# Patient Record
Sex: FEMALE | Race: WHITE | NOT HISPANIC OR LATINO | Employment: OTHER | URBAN - METROPOLITAN AREA
[De-identification: names, ages, dates, MRNs, and addresses within clinical notes are randomized per-mention and may not be internally consistent; named-entity substitution may affect disease eponyms.]

---

## 2023-07-15 DIAGNOSIS — M19.90 UNSPECIFIED OSTEOARTHRITIS, UNSPECIFIED SITE: ICD-10-CM

## 2023-07-17 RX ORDER — DICLOFENAC SODIUM 50 MG/1
TABLET, DELAYED RELEASE ORAL
Qty: 30 TABLET | Refills: 3 | Status: SHIPPED | OUTPATIENT
Start: 2023-07-17 | End: 2023-11-14

## 2023-08-28 DIAGNOSIS — M54.16 RADICULOPATHY, LUMBAR REGION: ICD-10-CM

## 2023-08-28 RX ORDER — GABAPENTIN 300 MG/1
300 CAPSULE ORAL 4 TIMES DAILY
Qty: 120 CAPSULE | Refills: 3 | Status: SHIPPED | OUTPATIENT
Start: 2023-08-28 | End: 2024-01-22 | Stop reason: SDUPTHER

## 2023-11-14 DIAGNOSIS — M19.90 UNSPECIFIED OSTEOARTHRITIS, UNSPECIFIED SITE: ICD-10-CM

## 2023-11-14 RX ORDER — DICLOFENAC SODIUM 50 MG/1
TABLET, DELAYED RELEASE ORAL
Qty: 30 TABLET | Refills: 3 | Status: SHIPPED | OUTPATIENT
Start: 2023-11-14 | End: 2024-03-13

## 2023-12-13 DIAGNOSIS — E78.5 HYPERLIPIDEMIA, UNSPECIFIED: ICD-10-CM

## 2023-12-13 RX ORDER — SIMVASTATIN 40 MG/1
TABLET, FILM COATED ORAL
Qty: 90 TABLET | Refills: 2 | Status: SHIPPED | OUTPATIENT
Start: 2023-12-13

## 2023-12-22 DIAGNOSIS — F32.A DEPRESSION, UNSPECIFIED DEPRESSION TYPE: Primary | ICD-10-CM

## 2023-12-22 RX ORDER — FLUOXETINE HYDROCHLORIDE 20 MG/1
20 CAPSULE ORAL 2 TIMES DAILY
COMMUNITY
End: 2023-12-22 | Stop reason: SDUPTHER

## 2023-12-22 RX ORDER — FLUOXETINE HYDROCHLORIDE 20 MG/1
20 CAPSULE ORAL 2 TIMES DAILY
Qty: 60 CAPSULE | Refills: 3 | Status: SHIPPED | OUTPATIENT
Start: 2023-12-22 | End: 2024-04-17

## 2024-01-22 DIAGNOSIS — M54.16 RADICULOPATHY, LUMBAR REGION: ICD-10-CM

## 2024-01-22 RX ORDER — GABAPENTIN 300 MG/1
300 CAPSULE ORAL 4 TIMES DAILY
Qty: 120 CAPSULE | Refills: 3 | Status: SHIPPED | OUTPATIENT
Start: 2024-01-22 | End: 2024-05-29

## 2024-02-22 NOTE — PROGRESS NOTES
Subjective   Sofia Calhoun is a 83 y.o. female who presents for patient is here for follow-up severe anxiety and panic Niece passed away.  HPI  83-year-old female history of hypertension depression hyperlipidemia gastroesophageal reflux disease back pain chronic depression was last seen in January 4, 2023 since patient said knee replacement Dr. Cardenas right knee was no major medical denies chest pain shortness of breath fever chill nausea vomiting constipation diarrhea this urgency fracture.  Review of Systems  10 system review pertinent as above, severe panic high bp niece passed away.  Objective     Visit Vitals  BP (!) 160/92   Pulse 78   Temp 36.6 °C (97.9 °F)   Resp 16      Physical Exam  HEENT: Atraumatic normocephalic the pupils are equal and round and reactive to light the sclerae nonicteric extraocular motion are intact.  Neck: Is supple without JVD no carotid bruits the trachea is midline there are no masses pulses are equal and bilateral with normal upstroke.  Skin: Normal.  Skin good texture.  Moist.  Good turgor.  No lesions, no rashes.  Lymph: No lymphadenopathy appreciated, no masses, no lesions  Lungs: Are clear to auscultation and percussion, good breath sounds bilaterally, no rhonchi, no wheezing, good diaphragmatic excursion.  Heart: Normal rate and normal rhythm S1, S2, no S3, no gallop, murmur or rub.  Abdomen: Soft, nontender, no organomegaly, good bowel sounds.    Extremities: Full range of motion, good pulses bilateral.  No cyanosis, no clubbing or edema.  Neuro: Cranial nerves II-XII are grossly intact there is no sensory or motor deficits.  Able to move all extremities.      Assessment/Plan     For follow-up and fasting blood works    CBC BMP lipids AST LT vitamin 25-hydroxy    Severe HTN  Due to panic  Increase hydralazin to 25 tid  Then check BP three times a day   Take an additional hydralazin   For BP Sytolic greater than 150  Or diastolic greater than 90      Continue with the  low-fat, low-cholesterol diet,  I recommended Mediterranean diet, which include fish, chicken, vegetables and olive oil  Exercise daily for 30 minutes at least 3 times a week  Continue home medications    Hypertension  No added salt diet, do not and salt to your food  Try to exercise every other day for 30 minutes  Continue current medications    Your depression  Continue current medication    Gastroesophageal reflux disease  Continue current medications  Include spicy foods do not eat late at night  Do not wear tight fitting clothes  Exercise daily    OARRS:  Nirmal Vazquez MD on 2024  3:04 PM  I have personally reviewed the OARRS report for Sofia Calhoun. I have considered the risks of abuse, dependence, addiction and diversion    Is the patient prescribed a combination of a benzodiazepine and opioid?  No    Last Urine Drug Screen / ordered today: Yes  No results found for this or any previous visit (from the past 8760 hour(s)).  Results are as expected.     Clinical rationale for not completing a Urine Drug Screen: na      Controlled Substance Agreement:  Date of the Last Agreement: 24  Reviewed Controlled Substance Agreement including but not limited to the benefits, risks, and alternatives to treatment with a Controlled Substance medication(s).    Benzodiazepines:  What is the patient's goal of therapy?  Severe intermittent anxiety  Is this being achieved with current treatment?  Yes took in the past    CHRISTELLE-7:  Over the last 2 weeks, how often have you been bothered by any of the following problems?  Feeling nervous, anxious, or on edge: 2  Not being able to stop or control worrying: 3  Worrying too much about different things: 2  Trouble relaxin  Being so restless that it is hard to sit still: 3  Becoming easily annoyed or irritable: 2  Feeling afraid as if something awful might happen: 2  CHRISTELLE-7 Total Score: 16        Activities of Daily Living:   Is your overall impression that this patient  is benefiting (symptom reduction outweighs side effects) from benzodiazepine therapy? Yes     1. Physical Functioning: Better  2. Family Relationship: Better  3. Social Relationship: Better  4. Mood: Better  5. Sleep Patterns: Better  6. Overall Function: Better      Problem List Items Addressed This Visit    None        Nirmal Vazquez MD

## 2024-02-26 ENCOUNTER — OFFICE VISIT (OUTPATIENT)
Dept: PRIMARY CARE | Facility: CLINIC | Age: 84
End: 2024-02-26
Payer: MEDICARE

## 2024-02-26 VITALS
WEIGHT: 165 LBS | DIASTOLIC BLOOD PRESSURE: 92 MMHG | HEART RATE: 78 BPM | RESPIRATION RATE: 16 BRPM | SYSTOLIC BLOOD PRESSURE: 160 MMHG | TEMPERATURE: 97.9 F | BODY MASS INDEX: 28.17 KG/M2 | HEIGHT: 64 IN

## 2024-02-26 DIAGNOSIS — E55.9 VITAMIN D INSUFFICIENCY: ICD-10-CM

## 2024-02-26 DIAGNOSIS — E78.2 MODERATE MIXED HYPERLIPIDEMIA NOT REQUIRING STATIN THERAPY: ICD-10-CM

## 2024-02-26 DIAGNOSIS — F41.9 ANXIETY: Primary | ICD-10-CM

## 2024-02-26 DIAGNOSIS — K21.9 GASTROESOPHAGEAL REFLUX DISEASE WITHOUT ESOPHAGITIS: ICD-10-CM

## 2024-02-26 DIAGNOSIS — F43.0 PANIC STATE AS ACUTE REACTION TO STRESS: ICD-10-CM

## 2024-02-26 DIAGNOSIS — Z79.899 MEDICATION MANAGEMENT: ICD-10-CM

## 2024-02-26 DIAGNOSIS — I10 SEVERE HYPERTENSION: ICD-10-CM

## 2024-02-26 DIAGNOSIS — R53.83 TIREDNESS: ICD-10-CM

## 2024-02-26 PROBLEM — M19.90 OSTEOARTHRITIS (ARTHRITIS DUE TO WEAR AND TEAR OF JOINTS): Status: ACTIVE | Noted: 2024-02-26

## 2024-02-26 LAB
AMPHETAMINES UR QL SCN: NORMAL
BARBITURATES UR QL SCN: NORMAL
BENZODIAZ UR QL SCN: NORMAL
BZE UR QL SCN: NORMAL
CANNABINOIDS UR QL SCN: NORMAL
FENTANYL+NORFENTANYL UR QL SCN: NORMAL
OPIATES UR QL SCN: NORMAL
OXYCODONE+OXYMORPHONE UR QL SCN: NORMAL
PCP UR QL SCN: NORMAL

## 2024-02-26 PROCEDURE — 84450 TRANSFERASE (AST) (SGOT): CPT | Performed by: INTERNAL MEDICINE

## 2024-02-26 PROCEDURE — 1036F TOBACCO NON-USER: CPT | Performed by: INTERNAL MEDICINE

## 2024-02-26 PROCEDURE — 3080F DIAST BP >= 90 MM HG: CPT | Performed by: INTERNAL MEDICINE

## 2024-02-26 PROCEDURE — 84443 ASSAY THYROID STIM HORMONE: CPT | Performed by: INTERNAL MEDICINE

## 2024-02-26 PROCEDURE — 80307 DRUG TEST PRSMV CHEM ANLYZR: CPT

## 2024-02-26 PROCEDURE — 85025 COMPLETE CBC W/AUTO DIFF WBC: CPT | Performed by: INTERNAL MEDICINE

## 2024-02-26 PROCEDURE — 1159F MED LIST DOCD IN RCRD: CPT | Performed by: INTERNAL MEDICINE

## 2024-02-26 PROCEDURE — 80048 BASIC METABOLIC PNL TOTAL CA: CPT | Performed by: INTERNAL MEDICINE

## 2024-02-26 PROCEDURE — 84460 ALANINE AMINO (ALT) (SGPT): CPT | Performed by: INTERNAL MEDICINE

## 2024-02-26 PROCEDURE — 99214 OFFICE O/P EST MOD 30 MIN: CPT | Performed by: INTERNAL MEDICINE

## 2024-02-26 PROCEDURE — 80061 LIPID PANEL: CPT | Performed by: INTERNAL MEDICINE

## 2024-02-26 PROCEDURE — 1126F AMNT PAIN NOTED NONE PRSNT: CPT | Performed by: INTERNAL MEDICINE

## 2024-02-26 PROCEDURE — 3077F SYST BP >= 140 MM HG: CPT | Performed by: INTERNAL MEDICINE

## 2024-02-26 RX ORDER — HYDRALAZINE HYDROCHLORIDE 25 MG/1
25 TABLET, FILM COATED ORAL 2 TIMES DAILY
COMMUNITY
Start: 2023-12-19

## 2024-02-26 RX ORDER — METOPROLOL SUCCINATE 25 MG/1
TABLET, EXTENDED RELEASE ORAL
COMMUNITY
Start: 2023-12-14

## 2024-02-26 RX ORDER — CLONAZEPAM 0.5 MG/1
0.5 TABLET ORAL NIGHTLY
Qty: 30 TABLET | Refills: 0 | Status: SHIPPED | OUTPATIENT
Start: 2024-02-26 | End: 2024-03-27

## 2024-02-26 RX ORDER — LISINOPRIL 20 MG/1
1 TABLET ORAL DAILY
COMMUNITY
Start: 2023-01-04

## 2024-02-26 ASSESSMENT — ANXIETY QUESTIONNAIRES
6. BECOMING EASILY ANNOYED OR IRRITABLE: MORE THAN HALF THE DAYS
IF YOU CHECKED OFF ANY PROBLEMS ON THIS QUESTIONNAIRE, HOW DIFFICULT HAVE THESE PROBLEMS MADE IT FOR YOU TO DO YOUR WORK, TAKE CARE OF THINGS AT HOME, OR GET ALONG WITH OTHER PEOPLE: SOMEWHAT DIFFICULT
5. BEING SO RESTLESS THAT IT IS HARD TO SIT STILL: NEARLY EVERY DAY
1. FEELING NERVOUS, ANXIOUS, OR ON EDGE: MORE THAN HALF THE DAYS
3. WORRYING TOO MUCH ABOUT DIFFERENT THINGS: MORE THAN HALF THE DAYS
4. TROUBLE RELAXING: MORE THAN HALF THE DAYS
7. FEELING AFRAID AS IF SOMETHING AWFUL MIGHT HAPPEN: MORE THAN HALF THE DAYS
2. NOT BEING ABLE TO STOP OR CONTROL WORRYING: NEARLY EVERY DAY
GAD7 TOTAL SCORE: 16

## 2024-02-26 ASSESSMENT — ENCOUNTER SYMPTOMS
OCCASIONAL FEELINGS OF UNSTEADINESS: 0
DEPRESSION: 0
LOSS OF SENSATION IN FEET: 0

## 2024-02-26 ASSESSMENT — PAIN SCALES - GENERAL: PAINLEVEL: 0-NO PAIN

## 2024-03-13 DIAGNOSIS — K21.9 GASTRO-ESOPHAGEAL REFLUX DISEASE WITHOUT ESOPHAGITIS: ICD-10-CM

## 2024-03-13 DIAGNOSIS — M19.90 UNSPECIFIED OSTEOARTHRITIS, UNSPECIFIED SITE: ICD-10-CM

## 2024-03-13 RX ORDER — PANTOPRAZOLE SODIUM 40 MG/1
40 TABLET, DELAYED RELEASE ORAL DAILY
Qty: 30 TABLET | Refills: 6 | Status: SHIPPED | OUTPATIENT
Start: 2024-03-13

## 2024-03-13 RX ORDER — DICLOFENAC SODIUM 50 MG/1
TABLET, DELAYED RELEASE ORAL
Qty: 30 TABLET | Refills: 3 | Status: SHIPPED | OUTPATIENT
Start: 2024-03-13

## 2024-04-13 DIAGNOSIS — F32.A DEPRESSION, UNSPECIFIED DEPRESSION TYPE: ICD-10-CM

## 2024-04-17 RX ORDER — FLUOXETINE HYDROCHLORIDE 20 MG/1
20 CAPSULE ORAL 2 TIMES DAILY
Qty: 60 CAPSULE | Refills: 3 | Status: SHIPPED | OUTPATIENT
Start: 2024-04-17

## 2024-05-29 DIAGNOSIS — M54.16 RADICULOPATHY, LUMBAR REGION: ICD-10-CM

## 2024-05-29 RX ORDER — GABAPENTIN 300 MG/1
300 CAPSULE ORAL 4 TIMES DAILY
Qty: 120 CAPSULE | Refills: 3 | Status: SHIPPED | OUTPATIENT
Start: 2024-05-29

## 2024-07-18 DIAGNOSIS — M19.90 UNSPECIFIED OSTEOARTHRITIS, UNSPECIFIED SITE: ICD-10-CM

## 2024-07-18 RX ORDER — DICLOFENAC SODIUM 50 MG/1
TABLET, DELAYED RELEASE ORAL
Qty: 30 TABLET | Refills: 3 | Status: SHIPPED | OUTPATIENT
Start: 2024-07-18

## 2024-08-14 DIAGNOSIS — F32.A DEPRESSION, UNSPECIFIED DEPRESSION TYPE: ICD-10-CM

## 2024-08-15 RX ORDER — FLUOXETINE HYDROCHLORIDE 20 MG/1
20 CAPSULE ORAL 2 TIMES DAILY
Qty: 60 CAPSULE | Refills: 3 | Status: SHIPPED | OUTPATIENT
Start: 2024-08-15

## 2024-09-11 DIAGNOSIS — E78.5 HYPERLIPIDEMIA, UNSPECIFIED: ICD-10-CM

## 2024-09-11 RX ORDER — SIMVASTATIN 40 MG/1
TABLET, FILM COATED ORAL
Qty: 90 TABLET | Refills: 2 | Status: SHIPPED | OUTPATIENT
Start: 2024-09-11

## 2024-09-26 DIAGNOSIS — M54.16 RADICULOPATHY, LUMBAR REGION: ICD-10-CM

## 2024-09-26 RX ORDER — GABAPENTIN 300 MG/1
300 CAPSULE ORAL 4 TIMES DAILY
Qty: 120 CAPSULE | Refills: 3 | Status: SHIPPED | OUTPATIENT
Start: 2024-09-26

## 2024-10-18 DIAGNOSIS — K21.9 GASTRO-ESOPHAGEAL REFLUX DISEASE WITHOUT ESOPHAGITIS: ICD-10-CM

## 2024-10-18 RX ORDER — PANTOPRAZOLE SODIUM 40 MG/1
40 TABLET, DELAYED RELEASE ORAL DAILY
Qty: 30 TABLET | Refills: 6 | Status: SHIPPED | OUTPATIENT
Start: 2024-10-18

## 2024-10-20 DIAGNOSIS — M19.90 UNSPECIFIED OSTEOARTHRITIS, UNSPECIFIED SITE: ICD-10-CM

## 2024-10-21 RX ORDER — DICLOFENAC SODIUM 50 MG/1
TABLET, DELAYED RELEASE ORAL
Qty: 30 TABLET | Refills: 1 | Status: SHIPPED | OUTPATIENT
Start: 2024-10-21

## 2024-11-21 NOTE — PROGRESS NOTES
Subjective   Sofia Calhoun is a 84 y.o. female who presents for patient is here for follow-up severe anxiety and panic Niece passed away.  HPI  84-year-old female history of hypertension depression hyperlipidemia gastroesophageal reflux disease back pain chronic depression was last seen in January 4, 2023 since patient said knee replacement Dr. Cardenas right knee was no major medical denies chest pain shortness of breath fever chill nausea vomiting constipation diarrhea this urgency fracture.  Review of Systems  10 system review pertinent as above, severe panic high bp niece passed away.  Objective     Visit Vitals  /85   Pulse 78   Temp 36.6 °C (97.9 °F)        Physical Exam  HEENT: Atraumatic normocephalic the pupils are equal and round and reactive to light the sclerae nonicteric extraocular motion are intact.  Neck: Is supple without JVD no carotid bruits the trachea is midline there are no masses pulses are equal and bilateral with normal upstroke.  Skin: Normal.  Skin good texture.  Moist.  Good turgor.  No lesions, no rashes.  Lymph: No lymphadenopathy appreciated, no masses, no lesions  Lungs: Are clear to auscultation and percussion, good breath sounds bilaterally, no rhonchi, no wheezing, good diaphragmatic excursion.  Heart: Normal rate and normal rhythm S1, S2, no S3, no gallop, murmur or rub.  Abdomen: Soft, nontender, no organomegaly, good bowel sounds.    Extremities: Full range of motion, good pulses bilateral.  No cyanosis, no clubbing or edema.  Neuro: Cranial nerves II-XII are grossly intact there is no sensory or motor deficits.  Able to move all extremities.      Assessment/Plan     For follow-up and fasting blood works    CBC BMP lipids AST LT vitamin 25-hydroxy      Immunizations  High dose flu vaccine      HTN  Increase hydralazin to 25 tid  Then check BP three times a day   Take an additional hydralazin   For BP Sytolic greater than 150  Or diastolic greater than  90      Continue with the low-fat, low-cholesterol diet,  I recommended Mediterranean diet, which include fish, chicken, vegetables and olive oil  Exercise daily for 30 minutes at least 3 times a week  Continue home medications    Hypertension  No added salt diet, do not and salt to your food  Try to exercise every other day for 30 minutes  Continue current medications    Your depression  Continue current medication    Gastroesophageal reflux disease  Continue current medications  Include spicy foods do not eat late at night  Do not wear tight fitting clothes  Exercise daily    OARRS:  Nirmal Vazquez MD on 11/26/2024 11:46 AM  I have personally reviewed the OARRS report for Sofia Calhoun. I have considered the risks of abuse, dependence, addiction and diversion    Is the patient prescribed a combination of a benzodiazepine and opioid?  No    Last Urine Drug Screen / ordered today: Yes  Recent Results (from the past 8760 hours)   Drug Screen, Urine With Reflex to Confirmation    Collection Time: 02/26/24  3:50 PM   Result Value Ref Range    Amphetamine Screen, Urine Presumptive Negative Presumptive Negative    Barbiturate Screen, Urine Presumptive Negative Presumptive Negative    Benzodiazepines Screen, Urine Presumptive Negative Presumptive Negative    Cannabinoid Screen, Urine Presumptive Negative Presumptive Negative    Cocaine Metabolite Screen, Urine Presumptive Negative Presumptive Negative    Fentanyl Screen, Urine Presumptive Negative Presumptive Negative    Opiate Screen, Urine Presumptive Negative Presumptive Negative    Oxycodone Screen, Urine Presumptive Negative Presumptive Negative    PCP Screen, Urine Presumptive Negative Presumptive Negative     Results are as expected.     Clinical rationale for not completing a Urine Drug Screen: na      Controlled Substance Agreement:  Date of the Last Agreement: 02/26/24  Reviewed Controlled Substance Agreement including but not limited to the benefits, risks,  and alternatives to treatment with a Controlled Substance medication(s).    Benzodiazepines:  What is the patient's goal of therapy?  Severe intermittent anxiety  Is this being achieved with current treatment?  Yes took in the past    CHRISTELLE-7:  Over the last 2 weeks, how often have you been bothered by any of the following problems?  Feeling nervous, anxious, or on edge: 1  Not being able to stop or control worryin  Worrying too much about different things: 1  Trouble relaxin  Being so restless that it is hard to sit still: 2  Becoming easily annoyed or irritable: 2  Feeling afraid as if something awful might happen: 1  CHRISTELLE-7 Total Score: 10          Activities of Daily Living:   Is your overall impression that this patient is benefiting (symptom reduction outweighs side effects) from benzodiazepine therapy? Yes     1. Physical Functioning: Better  2. Family Relationship: Better  3. Social Relationship: Better  4. Mood: Better  5. Sleep Patterns: Better  6. Overall Function: Better      Problem List Items Addressed This Visit       Anxiety       Orders:    clonazePAM (KlonoPIN) 0.5 mg tablet; Take 1 tablet (0.5 mg) by mouth once daily at bedtime.           Relevant Medications    clonazePAM (KlonoPIN) 0.5 mg tablet    Severe hypertension                   Hypertension - Primary       Orders:    hydrALAZINE (Apresoline) 25 mg tablet; Take 1 tablet (25 mg) by mouth 3 times a day.    lisinopril 20 mg tablet; Take 1 tablet (20 mg) by mouth once daily.    Basic Metabolic Panel           Relevant Medications    hydrALAZINE (Apresoline) 25 mg tablet    lisinopril 20 mg tablet    Other Relevant Orders    Basic Metabolic Panel    Vitamin D insufficiency                   S/P total knee arthroplasty                   Hyperlipidemia       Orders:    Lipid Panel    AST    ALT           Relevant Orders    Lipid Panel    AST    ALT    GERD (gastroesophageal reflux disease)       Orders:    CBC w/5 Part Differential,  [facility]           Relevant Orders    CBC w/5 Part Differential, [facility]    Diverticulosis                    Other Visit Diagnoses       Medication management        Relevant Orders    Drug Screen, Urine With Reflex to Confirmation    Adult onset hypothyroidism        Relevant Orders    TSH    Panic state as acute reaction to stress        Relevant Medications    clonazePAM (KlonoPIN) 0.5 mg tablet              Nirmal Vazquez MD

## 2024-11-26 ENCOUNTER — APPOINTMENT (OUTPATIENT)
Dept: PRIMARY CARE | Facility: CLINIC | Age: 84
End: 2024-11-26
Payer: MEDICARE

## 2024-11-26 VITALS
DIASTOLIC BLOOD PRESSURE: 85 MMHG | TEMPERATURE: 97.9 F | HEART RATE: 78 BPM | BODY MASS INDEX: 28.17 KG/M2 | WEIGHT: 165 LBS | SYSTOLIC BLOOD PRESSURE: 136 MMHG | HEIGHT: 64 IN

## 2024-11-26 DIAGNOSIS — F43.0 PANIC STATE AS ACUTE REACTION TO STRESS: ICD-10-CM

## 2024-11-26 DIAGNOSIS — K57.90 DIVERTICULOSIS: ICD-10-CM

## 2024-11-26 DIAGNOSIS — I10 PRIMARY HYPERTENSION: Primary | ICD-10-CM

## 2024-11-26 DIAGNOSIS — E78.2 MODERATE MIXED HYPERLIPIDEMIA NOT REQUIRING STATIN THERAPY: ICD-10-CM

## 2024-11-26 DIAGNOSIS — E55.9 VITAMIN D INSUFFICIENCY: ICD-10-CM

## 2024-11-26 DIAGNOSIS — Z79.899 MEDICATION MANAGEMENT: ICD-10-CM

## 2024-11-26 DIAGNOSIS — I10 SEVERE HYPERTENSION: ICD-10-CM

## 2024-11-26 DIAGNOSIS — F41.9 ANXIETY: ICD-10-CM

## 2024-11-26 DIAGNOSIS — E03.8 ADULT ONSET HYPOTHYROIDISM: ICD-10-CM

## 2024-11-26 DIAGNOSIS — K21.9 GASTROESOPHAGEAL REFLUX DISEASE WITHOUT ESOPHAGITIS: ICD-10-CM

## 2024-11-26 DIAGNOSIS — Z96.653 STATUS POST TOTAL BILATERAL KNEE REPLACEMENT: ICD-10-CM

## 2024-11-26 LAB
ALT SERPL W P-5'-P-CCNC: 24 U/L (ref 16–63)
AMPHETAMINES UR QL SCN: NORMAL
ANION GAP SERPL CALC-SCNC: 15 MMOL/L (ref 10–20)
AST SERPL W P-5'-P-CCNC: 18 U/L (ref 15–37)
BARBITURATES UR QL SCN: NORMAL
BASOPHILS # BLD AUTO: 0.01 X10*3/UL (ref 0.1–1.6)
BASOPHILS NFR BLD AUTO: 0.32 % (ref 0–0.3)
BENZODIAZ UR QL SCN: NORMAL
BUN SERPL-MCNC: 20 MG/DL (ref 7–18)
BZE UR QL SCN: NORMAL
CALCIUM SERPL-MCNC: 8.3 MG/DL (ref 8.5–10.1)
CANNABINOIDS UR QL SCN: NORMAL
CHLORIDE SERPL-SCNC: 104 MMOL/L (ref 98–107)
CHOLEST SERPL-MCNC: 152 MG/DL (ref 0–199)
CHOLESTEROL/HDL RATIO: 1.9 (ref 4.2–7)
CO2 SERPL-SCNC: 26 MMOL/L (ref 21–32)
CREAT SERPL-MCNC: 0.82 MG/DL (ref 0.6–1.1)
EGFRCR SERPLBLD CKD-EPI 2021: 70 ML/MIN/1.73M*2
EOSINOPHIL # BLD AUTO: 0.05 X10*3/UL (ref 0.04–0.5)
EOSINOPHIL NFR BLD AUTO: 1.37 % (ref 0.7–7)
ERYTHROCYTE [DISTWIDTH] IN BLOOD BY AUTOMATED COUNT: 14.8 % (ref 11.5–14.5)
FENTANYL+NORFENTANYL UR QL SCN: NORMAL
GLUCOSE SERPL-MCNC: 92 MG/DL (ref 74–100)
HCT VFR BLD AUTO: 39 % (ref 36.6–46.6)
HDLC SERPL-MCNC: 82 MG/DL (ref 40–59)
HGB BLD-MCNC: 13.05 G/DL (ref 12–15.4)
IS PATIENT FASTING: YES
LDLC SERPL DIRECT ASSAY-MCNC: 61 MG/DL (ref 0–100)
LYMPHOCYTES # BLD AUTO: 1.38 X10*3/UL (ref 0–6)
LYMPHOCYTES NFR BLD AUTO: 35.42 % (ref 20.5–51.1)
MCH RBC QN AUTO: 30.5 PG (ref 26–32)
MCHC RBC AUTO-ENTMCNC: 33.5 G/DL (ref 31–38)
MCV RBC AUTO: 91.2 FL (ref 80–96)
METHADONE UR QL SCN: NORMAL
MONOCYTES # BLD AUTO: 0.32 X10*3/UL (ref 1.6–24.9)
MONOCYTES NFR BLD AUTO: 8.29 % (ref 1.7–9.3)
NEUTROPHILS # BLD AUTO: 2.13 X10*3/UL (ref 1.4–6.5)
NEUTROPHILS NFR BLD AUTO: 54.6 % (ref 42.2–75.2)
OPIATES UR QL SCN: NORMAL
OXYCODONE+OXYMORPHONE UR QL SCN: NORMAL
PCP UR QL SCN: NORMAL
PLATELET # BLD AUTO: 193.3 X10*3/UL (ref 150–450)
PMV BLD AUTO: 10.55 FL (ref 7.8–11)
POTASSIUM SERPL-SCNC: 4.2 MMOL/L (ref 3.5–5.1)
RBC # BLD AUTO: 4.28 X10*6/UL (ref 3.9–5.3)
SODIUM SERPL-SCNC: 141 MMOL/L (ref 136–145)
TRIGL SERPL-MCNC: 69 MG/DL
TSH SERPL-ACNC: 1.91 MIU/L (ref 0.44–3.98)
WBC # BLD AUTO: 3.9 X10*3/UL (ref 4.5–10.5)

## 2024-11-26 PROCEDURE — G0442 ANNUAL ALCOHOL SCREEN 15 MIN: HCPCS | Performed by: INTERNAL MEDICINE

## 2024-11-26 PROCEDURE — 80307 DRUG TEST PRSMV CHEM ANLYZR: CPT

## 2024-11-26 PROCEDURE — G2211 COMPLEX E/M VISIT ADD ON: HCPCS | Performed by: INTERNAL MEDICINE

## 2024-11-26 PROCEDURE — G0439 PPPS, SUBSEQ VISIT: HCPCS | Performed by: INTERNAL MEDICINE

## 2024-11-26 PROCEDURE — 84450 TRANSFERASE (AST) (SGOT): CPT | Performed by: INTERNAL MEDICINE

## 2024-11-26 PROCEDURE — 1158F ADVNC CARE PLAN TLK DOCD: CPT | Performed by: INTERNAL MEDICINE

## 2024-11-26 PROCEDURE — 80048 BASIC METABOLIC PNL TOTAL CA: CPT | Performed by: INTERNAL MEDICINE

## 2024-11-26 PROCEDURE — 80061 LIPID PANEL: CPT | Performed by: INTERNAL MEDICINE

## 2024-11-26 PROCEDURE — G0444 DEPRESSION SCREEN ANNUAL: HCPCS | Performed by: INTERNAL MEDICINE

## 2024-11-26 PROCEDURE — 1159F MED LIST DOCD IN RCRD: CPT | Performed by: INTERNAL MEDICINE

## 2024-11-26 PROCEDURE — 99497 ADVNCD CARE PLAN 30 MIN: CPT | Performed by: INTERNAL MEDICINE

## 2024-11-26 PROCEDURE — 1125F AMNT PAIN NOTED PAIN PRSNT: CPT | Performed by: INTERNAL MEDICINE

## 2024-11-26 PROCEDURE — 3079F DIAST BP 80-89 MM HG: CPT | Performed by: INTERNAL MEDICINE

## 2024-11-26 PROCEDURE — 1170F FXNL STATUS ASSESSED: CPT | Performed by: INTERNAL MEDICINE

## 2024-11-26 PROCEDURE — 1160F RVW MEDS BY RX/DR IN RCRD: CPT | Performed by: INTERNAL MEDICINE

## 2024-11-26 PROCEDURE — 85025 COMPLETE CBC W/AUTO DIFF WBC: CPT | Performed by: INTERNAL MEDICINE

## 2024-11-26 PROCEDURE — 99214 OFFICE O/P EST MOD 30 MIN: CPT | Performed by: INTERNAL MEDICINE

## 2024-11-26 PROCEDURE — G0447 BEHAVIOR COUNSEL OBESITY 15M: HCPCS | Performed by: INTERNAL MEDICINE

## 2024-11-26 PROCEDURE — 84443 ASSAY THYROID STIM HORMONE: CPT | Performed by: INTERNAL MEDICINE

## 2024-11-26 PROCEDURE — 84460 ALANINE AMINO (ALT) (SGPT): CPT | Performed by: INTERNAL MEDICINE

## 2024-11-26 PROCEDURE — 3075F SYST BP GE 130 - 139MM HG: CPT | Performed by: INTERNAL MEDICINE

## 2024-11-26 PROCEDURE — 1123F ACP DISCUSS/DSCN MKR DOCD: CPT | Performed by: INTERNAL MEDICINE

## 2024-11-26 RX ORDER — HYDRALAZINE HYDROCHLORIDE 25 MG/1
25 TABLET, FILM COATED ORAL 3 TIMES DAILY
Start: 2024-11-26 | End: 2025-11-26

## 2024-11-26 RX ORDER — CLONAZEPAM 0.5 MG/1
0.5 TABLET ORAL NIGHTLY
Qty: 30 TABLET | Refills: 0 | Status: SHIPPED | OUTPATIENT
Start: 2024-11-26 | End: 2024-12-26

## 2024-11-26 RX ORDER — LISINOPRIL 20 MG/1
20 TABLET ORAL DAILY
Start: 2024-11-26 | End: 2025-11-26

## 2024-11-26 ASSESSMENT — ACTIVITIES OF DAILY LIVING (ADL)
PATIENT'S MEMORY ADEQUATE TO SAFELY COMPLETE DAILY ACTIVITIES?: YES
FEEDING YOURSELF: INDEPENDENT
TOILETING: INDEPENDENT
NEEDS ASSISTANCE WITH FOOD: INDEPENDENT
USING TELEPHONE: INDEPENDENT
BATHING: INDEPENDENT
JUDGMENT_ADEQUATE_SAFELY_COMPLETE_DAILY_ACTIVITIES: YES
PREPARING MEALS: INDEPENDENT
USING TRANSPORTATION: INDEPENDENT
STIL DRIVING: YES
WALKS IN HOME: INDEPENDENT
DRESSING YOURSELF: INDEPENDENT
TAKING MEDICATION: INDEPENDENT
HEARING - RIGHT EAR: FUNCTIONAL
EATING: INDEPENDENT
GROOMING: INDEPENDENT
GROCERY SHOPPING: INDEPENDENT
MANAGING FINANCES: INDEPENDENT
PILL BOX USED: NO
DOING HOUSEWORK: INDEPENDENT
ADEQUATE_TO_COMPLETE_ADL: YES

## 2024-11-26 ASSESSMENT — COLUMBIA-SUICIDE SEVERITY RATING SCALE - C-SSRS
6. HAVE YOU EVER DONE ANYTHING, STARTED TO DO ANYTHING, OR PREPARED TO DO ANYTHING TO END YOUR LIFE?: NO
1. IN THE PAST MONTH, HAVE YOU WISHED YOU WERE DEAD OR WISHED YOU COULD GO TO SLEEP AND NOT WAKE UP?: NO
2. HAVE YOU ACTUALLY HAD ANY THOUGHTS OF KILLING YOURSELF?: NO

## 2024-11-26 ASSESSMENT — ANXIETY QUESTIONNAIRES
IF YOU CHECKED OFF ANY PROBLEMS ON THIS QUESTIONNAIRE, HOW DIFFICULT HAVE THESE PROBLEMS MADE IT FOR YOU TO DO YOUR WORK, TAKE CARE OF THINGS AT HOME, OR GET ALONG WITH OTHER PEOPLE: NOT DIFFICULT AT ALL
GAD7 TOTAL SCORE: 10
4. TROUBLE RELAXING: SEVERAL DAYS
6. BECOMING EASILY ANNOYED OR IRRITABLE: MORE THAN HALF THE DAYS
7. FEELING AFRAID AS IF SOMETHING AWFUL MIGHT HAPPEN: SEVERAL DAYS
2. NOT BEING ABLE TO STOP OR CONTROL WORRYING: MORE THAN HALF THE DAYS
5. BEING SO RESTLESS THAT IT IS HARD TO SIT STILL: MORE THAN HALF THE DAYS
3. WORRYING TOO MUCH ABOUT DIFFERENT THINGS: SEVERAL DAYS
1. FEELING NERVOUS, ANXIOUS, OR ON EDGE: SEVERAL DAYS

## 2024-11-26 ASSESSMENT — ENCOUNTER SYMPTOMS
DEPRESSION: 0
OCCASIONAL FEELINGS OF UNSTEADINESS: 0
LOSS OF SENSATION IN FEET: 0

## 2024-11-26 ASSESSMENT — GERIATRIC MINI NUTRITIONAL ASSESSMENT (MNA)
B WEIGHT LOSS DURING THE LAST 3 MONTHS: NO WEIGHT LOSS
A HAS FOOD INTAKE DECLINED OVER THE PAST 3 MONTHS DUE TO LOSS OF APPETITE, DIGESTIVE PROBLEMS, CHEWING OR SWALLOWING DIFFICULTIES?: NO DECREASE IN FOOD INTAKE
D HAS SUFFERED PSYCHOLOGICAL STRESS OR ACUTE DISEASE IN THE PAST 3 MONTHS?: NO
E NEUROPSYCHOLOGICAL PROBLEMS: NO PSYCHOLOGICAL PROBLEMS
C GENERAL MOBILITY: GOES OUT

## 2024-11-26 ASSESSMENT — PATIENT HEALTH QUESTIONNAIRE - PHQ9
2. FEELING DOWN, DEPRESSED OR HOPELESS: NOT AT ALL
1. LITTLE INTEREST OR PLEASURE IN DOING THINGS: NOT AT ALL
SUM OF ALL RESPONSES TO PHQ9 QUESTIONS 1 AND 2: 0

## 2024-11-26 ASSESSMENT — PAIN SCALES - GENERAL: PAINLEVEL_OUTOF10: 2

## 2024-11-26 NOTE — PROGRESS NOTES
"Subjective   Reason for Visit: Sofia Calhoun is an 84 y.o. female here for a Medicare Wellness visit.   Patient is here for Medicare wellness  Reviewed all medications by prescribing practitioner or clinical pharmacist (such as prescriptions, OTCs, herbal therapies and supplements) and documented in the medical record.    HPI  Sofia is 84 she is self-sufficient takes her medication as prescribed with no major medical except for occasional lower back pain she reports no falling she takes her medication as prescribed without side effects to report.  Patient Care Team:  Nirmal Vazquez MD as PCP - General (Internal Medicine)  Nirmal Vazquez MD as PCP - [employer] PCP     Review of Systems  10 system review pertinent as above  Objective   Vitals:  /85   Pulse 78   Temp 36.6 °C (97.9 °F)   Ht 1.626 m (5' 4\")   Wt 74.8 kg (165 lb)   BMI 28.32 kg/m²       Physical Exam  HEENT: Atraumatic normocephalic the pupils are equal and round and reactive to light the sclerae nonicteric extraocular motion are intact.  Neck: Is supple without JVD no carotid bruits the trachea is midline there are no masses pulses are equal and bilateral with normal upstroke.  Skin: Normal.  Skin good texture.  Moist.  Good turgor.  No lesions, no rashes.  Lymph: No lymphadenopathy appreciated, no masses, no lesions  Lungs: Are clear to auscultation and percussion, good breath sounds bilaterally, no rhonchi, no wheezing, good diaphragmatic excursion.  Heart: Normal rate and normal rhythm S1, S2, no S3, no gallop, murmur or rub.  Abdomen: Soft, nontender, no organomegaly, good bowel sounds.    Extremities: Full range of motion, good pulses bilateral.  No cyanosis, no clubbing or edema.  Neuro: Cranial nerves II-XII are grossly intact there is no sensory or motor deficits.  Able to move all extremities.  Assessment & Plan  Primary hypertension    Orders:    hydrALAZINE (Apresoline) 25 mg tablet; Take 1 tablet (25 mg) by mouth 3 " times a day.    lisinopril 20 mg tablet; Take 1 tablet (20 mg) by mouth once daily.    Basic Metabolic Panel    Medication management    Orders:    Drug Screen, Urine With Reflex to Confirmation; Future    Drug Screen, Urine With Reflex to Confirmation    Severe hypertension         Moderate mixed hyperlipidemia not requiring statin therapy    Orders:    Lipid Panel    AST    ALT    Vitamin D insufficiency         Gastroesophageal reflux disease without esophagitis    Orders:    CBC w/5 Part Differential, [facility]    Diverticulosis         Status post total bilateral knee replacement         Adult onset hypothyroidism    Orders:    TSH    Anxiety    Orders:    clonazePAM (KlonoPIN) 0.5 mg tablet; Take 1 tablet (0.5 mg) by mouth once daily at bedtime.    Panic state as acute reaction to stress    Orders:    clonazePAM (KlonoPIN) 0.5 mg tablet; Take 1 tablet (0.5 mg) by mouth once daily at bedtime.

## 2024-11-26 NOTE — ASSESSMENT & PLAN NOTE
Orders:    clonazePAM (KlonoPIN) 0.5 mg tablet; Take 1 tablet (0.5 mg) by mouth once daily at bedtime.

## 2024-11-26 NOTE — ASSESSMENT & PLAN NOTE
Orders:    hydrALAZINE (Apresoline) 25 mg tablet; Take 1 tablet (25 mg) by mouth 3 times a day.    lisinopril 20 mg tablet; Take 1 tablet (20 mg) by mouth once daily.    Basic Metabolic Panel

## 2024-12-09 DIAGNOSIS — F32.A DEPRESSION, UNSPECIFIED DEPRESSION TYPE: ICD-10-CM

## 2024-12-12 RX ORDER — FLUOXETINE HYDROCHLORIDE 20 MG/1
20 CAPSULE ORAL 2 TIMES DAILY
Qty: 60 CAPSULE | Refills: 2 | Status: SHIPPED | OUTPATIENT
Start: 2024-12-12

## 2025-01-09 DIAGNOSIS — M19.90 UNSPECIFIED OSTEOARTHRITIS, UNSPECIFIED SITE: ICD-10-CM

## 2025-01-09 RX ORDER — DICLOFENAC SODIUM 50 MG/1
TABLET, DELAYED RELEASE ORAL
Qty: 30 TABLET | Refills: 1 | Status: SHIPPED | OUTPATIENT
Start: 2025-01-09

## 2025-02-03 DIAGNOSIS — M54.16 RADICULOPATHY, LUMBAR REGION: ICD-10-CM

## 2025-02-03 RX ORDER — GABAPENTIN 300 MG/1
300 CAPSULE ORAL 4 TIMES DAILY
Qty: 120 CAPSULE | Refills: 3 | Status: SHIPPED | OUTPATIENT
Start: 2025-02-03

## 2025-03-08 DIAGNOSIS — M19.90 UNSPECIFIED OSTEOARTHRITIS, UNSPECIFIED SITE: ICD-10-CM

## 2025-03-08 DIAGNOSIS — F32.A DEPRESSION, UNSPECIFIED DEPRESSION TYPE: ICD-10-CM

## 2025-03-10 RX ORDER — DICLOFENAC SODIUM 50 MG/1
TABLET, DELAYED RELEASE ORAL
Qty: 30 TABLET | Refills: 2 | Status: SHIPPED | OUTPATIENT
Start: 2025-03-10

## 2025-03-10 RX ORDER — FLUOXETINE HYDROCHLORIDE 20 MG/1
20 CAPSULE ORAL 2 TIMES DAILY
Qty: 60 CAPSULE | Refills: 2 | Status: SHIPPED | OUTPATIENT
Start: 2025-03-10

## 2025-05-03 DIAGNOSIS — K21.9 GASTRO-ESOPHAGEAL REFLUX DISEASE WITHOUT ESOPHAGITIS: ICD-10-CM

## 2025-05-08 RX ORDER — PANTOPRAZOLE SODIUM 40 MG/1
40 TABLET, DELAYED RELEASE ORAL DAILY
Qty: 30 TABLET | Refills: 6 | Status: SHIPPED | OUTPATIENT
Start: 2025-05-08

## 2025-05-26 DIAGNOSIS — M54.16 RADICULOPATHY, LUMBAR REGION: ICD-10-CM

## 2025-05-26 RX ORDER — GABAPENTIN 300 MG/1
300 CAPSULE ORAL 4 TIMES DAILY
Qty: 120 CAPSULE | Refills: 1 | Status: SHIPPED | OUTPATIENT
Start: 2025-05-26 | End: 2025-05-28

## 2025-05-28 ENCOUNTER — OFFICE VISIT (OUTPATIENT)
Dept: PRIMARY CARE | Facility: CLINIC | Age: 85
End: 2025-05-28
Payer: MEDICARE

## 2025-05-28 VITALS
HEART RATE: 78 BPM | TEMPERATURE: 98.1 F | BODY MASS INDEX: 26.29 KG/M2 | WEIGHT: 154 LBS | RESPIRATION RATE: 16 BRPM | SYSTOLIC BLOOD PRESSURE: 120 MMHG | DIASTOLIC BLOOD PRESSURE: 82 MMHG | HEIGHT: 64 IN

## 2025-05-28 DIAGNOSIS — I10 SEVERE HYPERTENSION: ICD-10-CM

## 2025-05-28 DIAGNOSIS — M54.30 ACUTE SCIATICA: Primary | ICD-10-CM

## 2025-05-28 DIAGNOSIS — E78.2 MODERATE MIXED HYPERLIPIDEMIA NOT REQUIRING STATIN THERAPY: ICD-10-CM

## 2025-05-28 DIAGNOSIS — F41.9 ANXIETY DISORDER, UNSPECIFIED TYPE: ICD-10-CM

## 2025-05-28 DIAGNOSIS — M62.838 MUSCLE SPASM: ICD-10-CM

## 2025-05-28 DIAGNOSIS — K21.9 GASTROESOPHAGEAL REFLUX DISEASE WITHOUT ESOPHAGITIS: ICD-10-CM

## 2025-05-28 PROCEDURE — 1159F MED LIST DOCD IN RCRD: CPT | Performed by: INTERNAL MEDICINE

## 2025-05-28 PROCEDURE — 1036F TOBACCO NON-USER: CPT | Performed by: INTERNAL MEDICINE

## 2025-05-28 PROCEDURE — 1123F ACP DISCUSS/DSCN MKR DOCD: CPT | Performed by: INTERNAL MEDICINE

## 2025-05-28 PROCEDURE — 3079F DIAST BP 80-89 MM HG: CPT | Performed by: INTERNAL MEDICINE

## 2025-05-28 PROCEDURE — 1125F AMNT PAIN NOTED PAIN PRSNT: CPT | Performed by: INTERNAL MEDICINE

## 2025-05-28 PROCEDURE — 99214 OFFICE O/P EST MOD 30 MIN: CPT | Performed by: INTERNAL MEDICINE

## 2025-05-28 PROCEDURE — 3074F SYST BP LT 130 MM HG: CPT | Performed by: INTERNAL MEDICINE

## 2025-05-28 PROCEDURE — 1158F ADVNC CARE PLAN TLK DOCD: CPT | Performed by: INTERNAL MEDICINE

## 2025-05-28 PROCEDURE — G2211 COMPLEX E/M VISIT ADD ON: HCPCS | Performed by: INTERNAL MEDICINE

## 2025-05-28 RX ORDER — GABAPENTIN 400 MG/1
400 CAPSULE ORAL EVERY 6 HOURS PRN
Qty: 90 CAPSULE | Refills: 11 | Status: SHIPPED | OUTPATIENT
Start: 2025-05-28 | End: 2026-05-28

## 2025-05-28 RX ORDER — PREDNISONE 20 MG/1
TABLET ORAL
Qty: 20 TABLET | Refills: 0 | Status: SHIPPED | OUTPATIENT
Start: 2025-05-28 | End: 2025-06-09

## 2025-05-28 RX ORDER — TIZANIDINE HYDROCHLORIDE 2 MG/1
2 CAPSULE, GELATIN COATED ORAL EVERY 12 HOURS PRN
Qty: 20 CAPSULE | Refills: 0 | Status: SHIPPED | OUTPATIENT
Start: 2025-05-28 | End: 2025-06-07

## 2025-05-28 RX ORDER — TIZANIDINE HYDROCHLORIDE 2 MG/1
2 CAPSULE, GELATIN COATED ORAL EVERY 12 HOURS PRN
COMMUNITY
Start: 2025-05-26 | End: 2025-05-28 | Stop reason: SDUPTHER

## 2025-05-28 ASSESSMENT — ENCOUNTER SYMPTOMS
DEPRESSION: 0
OCCASIONAL FEELINGS OF UNSTEADINESS: 0
LOSS OF SENSATION IN FEET: 0

## 2025-05-28 ASSESSMENT — PAIN SCALES - GENERAL: PAINLEVEL_OUTOF10: 6

## 2025-05-28 NOTE — PROGRESS NOTES
Subjective   Sofia Calhoun is a 84 y.o. female who presents for patient is here for post ED follow back pain.  HPI  84-year-old female history of hypertension depression hyperlipidemia gastroesophageal reflux disease back pain chronic depression was last seen in January 4, 2023 since patient said knee replacement Dr. Cardenas right knee was no major medical denies chest pain shortness of breath fever chill nausea vomiting constipation diarrhea this urgency fracture. Was at ED for lower back pain sciatica.   Review of Systems  10 system review pertinent as above, severe panic high bp niece passed away.  Objective     Visit Vitals  /82   Pulse 78   Temp 36.7 °C (98.1 °F)   Resp 16        Physical Exam  HEENT: Atraumatic normocephalic the pupils are equal and round and reactive to light the sclerae nonicteric extraocular motion are intact.  Neck: Is supple without JVD no carotid bruits the trachea is midline there are no masses pulses are equal and bilateral with normal upstroke.  Skin: Normal.  Skin good texture.  Moist.  Good turgor.  No lesions, no rashes.  Lymph: No lymphadenopathy appreciated, no masses, no lesions  Lungs: Are clear to auscultation and percussion, good breath sounds bilaterally, no rhonchi, no wheezing, good diaphragmatic excursion.  Heart: Normal rate and normal rhythm S1, S2, no S3, no gallop, murmur or rub.  Abdomen: Soft, nontender, no organomegaly, good bowel sounds.    Extremities: Full range of motion, good pulses bilateral.  No cyanosis, no clubbing or edema.  Neuro: Cranial nerves II-XII are grossly intact there is no sensory or motor deficits.  Able to move all extremities.      Assessment/Plan     Acute sciatica with lumbar radiculopathy      Immunizations  High dose flu vaccine      HTN  Increase hydralazin to 25 tid  Then check BP three times a day   Take an additional hydralazin   For BP Sytolic greater than 150  Or diastolic greater than 90      Continue with the  low-fat, low-cholesterol diet,  I recommended Mediterranean diet, which include fish, chicken, vegetables and olive oil  Exercise daily for 30 minutes at least 3 times a week  Continue home medications    Hypertension  No added salt diet, do not and salt to your food  Try to exercise every other day for 30 minutes  Continue current medications    Your depression  Continue current medication    Gastroesophageal reflux disease  Continue current medications  Include spicy foods do not eat late at night  Do not wear tight fitting clothes  Exercise daily    OARRS:  No data recorded  I have personally reviewed the OARRS report for Sofia Calhoun. I have considered the risks of abuse, dependence, addiction and diversion    Is the patient prescribed a combination of a benzodiazepine and opioid?  No    Last Urine Drug Screen / ordered today: Yes  Recent Results (from the past 8760 hours)   Drug Screen, Urine With Reflex to Confirmation    Collection Time: 11/26/24 12:21 PM   Result Value Ref Range    Amphetamine Screen, Urine Presumptive Negative Presumptive Negative    Barbiturate Screen, Urine Presumptive Negative Presumptive Negative    Benzodiazepines Screen, Urine Presumptive Negative Presumptive Negative    Cannabinoid Screen, Urine Presumptive Negative Presumptive Negative    Cocaine Metabolite Screen, Urine Presumptive Negative Presumptive Negative    Fentanyl Screen, Urine Presumptive Negative Presumptive Negative    Opiate Screen, Urine Presumptive Negative Presumptive Negative    Oxycodone Screen, Urine Presumptive Negative Presumptive Negative    PCP Screen, Urine Presumptive Negative Presumptive Negative    Methadone Screen, Urine Presumptive Negative Presumptive Negative     Results are as expected.     Clinical rationale for not completing a Urine Drug Screen: na      Controlled Substance Agreement:  Date of the Last Agreement: 02/26/24  Reviewed Controlled Substance Agreement including but not limited to  the benefits, risks, and alternatives to treatment with a Controlled Substance medication(s).    Benzodiazepines:  What is the patient's goal of therapy?  Severe intermittent anxiety  Is this being achieved with current treatment?  Yes took in the past    CHRISTELLE-7:  No data recorded        Activities of Daily Living:   Is your overall impression that this patient is benefiting (symptom reduction outweighs side effects) from benzodiazepine therapy? Yes     1. Physical Functioning: Better  2. Family Relationship: Better  3. Social Relationship: Better  4. Mood: Better  5. Sleep Patterns: Better  6. Overall Function: Better      Problem List Items Addressed This Visit    None  Visit Diagnoses         Acute sciatica    -  Primary    Relevant Medications    predniSONE (Deltasone) 20 mg tablet    gabapentin (Neurontin) 400 mg capsule                Nirmal Vazquez MD

## 2025-05-31 DIAGNOSIS — M19.90 UNSPECIFIED OSTEOARTHRITIS, UNSPECIFIED SITE: ICD-10-CM

## 2025-05-31 DIAGNOSIS — F32.A DEPRESSION, UNSPECIFIED DEPRESSION TYPE: ICD-10-CM

## 2025-06-04 RX ORDER — DICLOFENAC SODIUM 50 MG/1
50 TABLET, DELAYED RELEASE ORAL DAILY
Qty: 30 TABLET | Refills: 2 | Status: SHIPPED | OUTPATIENT
Start: 2025-06-04

## 2025-06-04 RX ORDER — FLUOXETINE 20 MG/1
20 CAPSULE ORAL 2 TIMES DAILY
Qty: 60 CAPSULE | Refills: 2 | Status: SHIPPED | OUTPATIENT
Start: 2025-06-04

## 2025-06-09 ENCOUNTER — TELEPHONE (OUTPATIENT)
Dept: PRIMARY CARE | Facility: CLINIC | Age: 85
End: 2025-06-09
Payer: MEDICARE

## 2025-06-09 DIAGNOSIS — M54.32 BILATERAL SCIATICA: Primary | ICD-10-CM

## 2025-06-09 DIAGNOSIS — M54.31 BILATERAL SCIATICA: Primary | ICD-10-CM

## 2025-06-09 NOTE — TELEPHONE ENCOUNTER
Patient has completed the prednisone and muscle relaxer for her back, she has not gotten any relief from it she need advice on what she should do. She is in a lot of pain.

## 2025-06-18 ENCOUNTER — LAB REQUISITION (OUTPATIENT)
Dept: LAB | Facility: HOSPITAL | Age: 85
End: 2025-06-18
Payer: MEDICARE

## 2025-06-18 DIAGNOSIS — M48.062 SPINAL STENOSIS, LUMBAR REGION WITH NEUROGENIC CLAUDICATION: ICD-10-CM

## 2025-06-18 DIAGNOSIS — E78.5 HYPERLIPIDEMIA, UNSPECIFIED: ICD-10-CM

## 2025-06-18 LAB
ALBUMIN SERPL BCP-MCNC: 3.7 G/DL (ref 3.4–5)
ALP SERPL-CCNC: 59 U/L (ref 33–136)
ALT SERPL W P-5'-P-CCNC: 13 U/L (ref 7–45)
ANION GAP SERPL CALC-SCNC: 12 MMOL/L (ref 10–20)
AST SERPL W P-5'-P-CCNC: 14 U/L (ref 9–39)
BILIRUB SERPL-MCNC: 0.7 MG/DL (ref 0–1.2)
BUN SERPL-MCNC: 17 MG/DL (ref 6–23)
CALCIUM SERPL-MCNC: 8 MG/DL (ref 8.6–10.3)
CHLORIDE SERPL-SCNC: 106 MMOL/L (ref 98–107)
CO2 SERPL-SCNC: 25 MMOL/L (ref 21–32)
CREAT SERPL-MCNC: 0.64 MG/DL (ref 0.5–1.05)
EGFRCR SERPLBLD CKD-EPI 2021: 87 ML/MIN/1.73M*2
ERYTHROCYTE [DISTWIDTH] IN BLOOD BY AUTOMATED COUNT: 14.6 % (ref 11.5–14.5)
GLUCOSE SERPL-MCNC: 94 MG/DL (ref 74–99)
HCT VFR BLD AUTO: 39.2 % (ref 36–46)
HGB BLD-MCNC: 12.6 G/DL (ref 12–16)
MCH RBC QN AUTO: 30.7 PG (ref 26–34)
MCHC RBC AUTO-ENTMCNC: 32.1 G/DL (ref 32–36)
MCV RBC AUTO: 96 FL (ref 80–100)
NRBC BLD-RTO: 0 /100 WBCS (ref 0–0)
PLATELET # BLD AUTO: 134 X10*3/UL (ref 150–450)
POTASSIUM SERPL-SCNC: 3.8 MMOL/L (ref 3.5–5.3)
PROT SERPL-MCNC: 5.5 G/DL (ref 6.4–8.2)
RBC # BLD AUTO: 4.1 X10*6/UL (ref 4–5.2)
SODIUM SERPL-SCNC: 139 MMOL/L (ref 136–145)
WBC # BLD AUTO: 4.1 X10*3/UL (ref 4.4–11.3)

## 2025-06-18 PROCEDURE — 36415 COLL VENOUS BLD VENIPUNCTURE: CPT | Mod: OUT | Performed by: FAMILY MEDICINE

## 2025-06-18 PROCEDURE — 85027 COMPLETE CBC AUTOMATED: CPT | Mod: OUT | Performed by: FAMILY MEDICINE

## 2025-06-18 PROCEDURE — 80053 COMPREHEN METABOLIC PANEL: CPT | Mod: OUT | Performed by: FAMILY MEDICINE

## 2025-06-19 RX ORDER — SIMVASTATIN 40 MG/1
40 TABLET, FILM COATED ORAL DAILY
Qty: 90 TABLET | Refills: 2 | Status: SHIPPED | OUTPATIENT
Start: 2025-06-19

## 2025-06-24 ENCOUNTER — TELEPHONE (OUTPATIENT)
Dept: PRIMARY CARE | Facility: CLINIC | Age: 85
End: 2025-06-24
Payer: MEDICARE

## 2025-06-24 DIAGNOSIS — M54.30 SCIATICA, UNSPECIFIED LATERALITY: Primary | ICD-10-CM

## 2025-06-25 ENCOUNTER — NURSING HOME VISIT (OUTPATIENT)
Dept: POST ACUTE CARE | Facility: EXTERNAL LOCATION | Age: 85
End: 2025-06-25
Payer: MEDICARE

## 2025-06-25 DIAGNOSIS — M51.362 DEGENERATION OF INTERVERTEBRAL DISC OF LUMBAR REGION WITH DISCOGENIC BACK PAIN AND LOWER EXTREMITY PAIN: Primary | ICD-10-CM

## 2025-06-25 DIAGNOSIS — K21.9 GASTROESOPHAGEAL REFLUX DISEASE WITHOUT ESOPHAGITIS: ICD-10-CM

## 2025-06-25 DIAGNOSIS — I10 PRIMARY HYPERTENSION: ICD-10-CM

## 2025-06-25 PROCEDURE — 99305 1ST NF CARE MODERATE MDM 35: CPT | Performed by: FAMILY MEDICINE

## 2025-06-25 ASSESSMENT — ENCOUNTER SYMPTOMS
FATIGUE: 0
FEVER: 0
VOMITING: 0
HEADACHES: 0
NAUSEA: 0
SHORTNESS OF BREATH: 0
DIARRHEA: 0
DYSURIA: 0
COUGH: 0
CHILLS: 0
SORE THROAT: 0
ABDOMINAL PAIN: 0
CONSTIPATION: 0

## 2025-06-25 NOTE — PROGRESS NOTES
Subjective   Patient ID: Sofia Calhoun is a 84 y.o. female who is acute skilled care being seen and evaluated for multiple medical problems.    HPI 84-year-old admitted here for rehabilitation recent presentation to the hospital with complaints of low back pain with right lower extremity sciatica.  Does have a history of lumbar degenerative joint disease.  No loss of bowel or bladder function was noted.  No fractures were noted on CT of the pelvis or lumbar spine.  EpiPen was continued.  Terrell did show stable degenerative disc disease.  To follow-up with neurosurgery but overall pain control has been adequate with bowel stable.  Pressure has remained improved here with increase hydralazine to 75 3 times daily.  Other home medications were continued.  She is likely being discharged shortly and has follow-up scheduled with Dr. Chris in July.  Admission labs here showed normal liver enzymes and kidney function as well as no anemia.      Review of Systems   Constitutional:  Negative for chills, fatigue and fever.   HENT:  Negative for congestion and sore throat.    Eyes:  Negative for visual disturbance.   Respiratory:  Negative for cough and shortness of breath.    Cardiovascular:  Negative for chest pain.   Gastrointestinal:  Negative for abdominal pain, constipation, diarrhea, nausea and vomiting.   Genitourinary:  Negative for dysuria.   Neurological:  Negative for headaches.       Objective   There were no vitals taken for this visit.    Physical Exam  Vitals reviewed: Weight 169 blood pressure 133/83 temp 97.6 pulse 58.   Constitutional:       Appearance: Normal appearance.   HENT:      Mouth/Throat:      Mouth: Mucous membranes are moist.      Pharynx: Oropharynx is clear.   Cardiovascular:      Rate and Rhythm: Normal rate and regular rhythm.      Heart sounds: Normal heart sounds.   Pulmonary:      Effort: No respiratory distress.      Breath sounds: Normal breath sounds. No wheezing.   Abdominal:       General: There is no distension.      Palpations: Abdomen is soft.      Tenderness: There is no abdominal tenderness.   Musculoskeletal:      Right lower leg: No edema.      Left lower leg: No edema.   Neurological:      General: No focal deficit present.      Mental Status: She is alert.         Assessment/Plan   Problem List Items Addressed This Visit           ICD-10-CM    Hypertension I10    GERD (gastroesophageal reflux disease) K21.9     Other Visit Diagnoses         Codes      Degeneration of intervertebral disc of lumbar region with discogenic back pain and lower extremity pain    -  Primary M51.362          Hospital records and labs reviewed.  Continue with PT and OT and follow-up as scheduled   Goals    None

## 2025-06-25 NOTE — LETTER
Patient: Sofia Calhoun  : 1940    Encounter Date: 2025    Subjective  Patient ID: Sofia Calhoun is a 84 y.o. female who is acute skilled care being seen and evaluated for multiple medical problems.    HPI 84-year-old admitted here for rehabilitation recent presentation to the hospital with complaints of low back pain with right lower extremity sciatica.  Does have a history of lumbar degenerative joint disease.  No loss of bowel or bladder function was noted.  No fractures were noted on CT of the pelvis or lumbar spine.  EpiPen was continued.  Terrell did show stable degenerative disc disease.  To follow-up with neurosurgery but overall pain control has been adequate with bowel stable.  Pressure has remained improved here with increase hydralazine to 75 3 times daily.  Other home medications were continued.  She is likely being discharged shortly and has follow-up scheduled with Dr. Chris in July.  Admission labs here showed normal liver enzymes and kidney function as well as no anemia.      Review of Systems   Constitutional:  Negative for chills, fatigue and fever.   HENT:  Negative for congestion and sore throat.    Eyes:  Negative for visual disturbance.   Respiratory:  Negative for cough and shortness of breath.    Cardiovascular:  Negative for chest pain.   Gastrointestinal:  Negative for abdominal pain, constipation, diarrhea, nausea and vomiting.   Genitourinary:  Negative for dysuria.   Neurological:  Negative for headaches.       Objective  There were no vitals taken for this visit.    Physical Exam  Vitals reviewed: Weight 169 blood pressure 133/83 temp 97.6 pulse 58.   Constitutional:       Appearance: Normal appearance.   HENT:      Mouth/Throat:      Mouth: Mucous membranes are moist.      Pharynx: Oropharynx is clear.   Cardiovascular:      Rate and Rhythm: Normal rate and regular rhythm.      Heart sounds: Normal heart sounds.   Pulmonary:      Effort: No respiratory distress.       Breath sounds: Normal breath sounds. No wheezing.   Abdominal:      General: There is no distension.      Palpations: Abdomen is soft.      Tenderness: There is no abdominal tenderness.   Musculoskeletal:      Right lower leg: No edema.      Left lower leg: No edema.   Neurological:      General: No focal deficit present.      Mental Status: She is alert.         Assessment/Plan  Problem List Items Addressed This Visit           ICD-10-CM    Hypertension I10    GERD (gastroesophageal reflux disease) K21.9     Other Visit Diagnoses         Codes      Degeneration of intervertebral disc of lumbar region with discogenic back pain and lower extremity pain    -  Primary M51.362          Hospital records and labs reviewed.  Continue with PT and OT and follow-up as scheduled   Goals    None         Electronically Signed By: Heidy Schrader MD   6/25/25  4:40 PM

## 2025-07-01 ENCOUNTER — PATIENT OUTREACH (OUTPATIENT)
Dept: CARE COORDINATION | Facility: CLINIC | Age: 85
End: 2025-07-01

## 2025-07-01 ENCOUNTER — APPOINTMENT (OUTPATIENT)
Dept: PRIMARY CARE | Facility: CLINIC | Age: 85
End: 2025-07-01
Payer: MEDICARE

## 2025-07-01 SDOH — ECONOMIC STABILITY: GENERAL: WOULD YOU LIKE HELP WITH ANY OF THE FOLLOWING NEEDS?: I DONT NEED HELP WITH ANY OF THESE

## 2025-07-01 SDOH — ECONOMIC STABILITY: FOOD INSECURITY
ARE ANY OF YOUR NEEDS URGENT? FOR EXAMPLE, UNCERTAINTY OF WHERE YOU WILL GET YOUR NEXT MEAL OR NOT HAVING THE MEDICATIONS YOU NEED TO TAKE TOMORROW.: NO

## 2025-07-01 NOTE — SIGNIFICANT EVENT
07/01/25 1424   Social Determinants of Health- Help Requested   Would you like help with any of the following needs? I dont need help with any of these   Are any of your needs urgent? For example, uncertainty of where you will get your next meal or not having the medications you need to take tomorrow. N

## 2025-07-01 NOTE — PROGRESS NOTES
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  Patient states, she is doing good. Patient continues to have pain in her right leg. Patient remains wheelchair bound. Patient states, prior to all of this she was up, ambulatory, and active. Patient's goal is to return back to that prior level of function. Patient states, she is unable to bear any weight on that right because the pain is too intense. She states, it feels like an electrical shock. Patient states, she is able to pivot transfer with use of her left leg.  Patient states, she continues to do the exercises she did at the skilled nursing facility. Patient has family and friends that are very involved with her care. Patient does live home alone but her family checks in on her daily. Her daughter has provided her with groceries and bring meals to the house. Family is going to put up a ramp to assist with getting in and out of the house.  Family is providing all transportation needs. Reviewed discharge medications, discharge instructions, assessed social needs, and provided education on importance of follow-up appointment with provider.      Engagement  Call Start Time: 1402 (7/1/2025  2:02 PM)    Medications  Medications reviewed with patient/caregiver?: Yes (7/1/2025  2:02 PM)  Is the patient having any side effects they believe may be caused by any medication additions or changes?: No (7/1/2025  2:02 PM)  Does the patient have all medications ordered at discharge?: Yes (7/1/2025  2:02 PM)  Care Management Interventions: Provided patient education (7/1/2025  2:02 PM)  Prescription Comments: Patient is taking  Hydralazine 75mg TID, Robaxin, Tylenol.  Patient is no longer taking the Oxycodone (7/1/2025  2:02 PM)  Is the patient taking all medications as directed (includes completed medication regime)?: Yes (7/1/2025  2:02 PM)    Appointments  Does the patient have a primary care provider?: Yes (Patient to call and reschedule) (7/1/2025  2:02 PM)  Care Management Interventions: Advised patient to make appointment (7/1/2025  2:02 PM)  Has the patient kept scheduled appointments due by today?: No (7/1/2025  2:02 PM)  What is preventing the patient from keeping their appointments?: -- (change in ambulatory ststus, continued uncontrolled pain) (7/1/2025  2:02 PM)  Care Management Interventions: Advised to reschedule appointment (7/1/2025  2:02 PM)    Self Management  What is the home health agency?: [facility] (7/1/2025  2:02 PM)  Has home health visited the patient within 72 hours of discharge?: Not applicable (7/1/2025  2:02 PM)  Home Health Interventions: Called home health agency (Nathan Wade at [facility] PT scheduled for 7/2/2025) (7/1/2025  2:02 PM)  What Durable Medical Equipment (DME) was ordered?: not applicable (7/1/2025  2:02 PM)    Patient Teaching  Does the patient have access to their discharge instructions?: Yes (7/1/2025  2:02 PM)  Care Management Interventions: Reviewed instructions with patient (7/1/2025  2:02 PM)  What is the patient's perception of their health status since discharge?: Improving (7/1/2025  2:02 PM)  Is the patient/caregiver able to teach back the hierarchy of who to call/visit for symptoms/problems? PCP, Specialist, Home Health nurse, Urgent Care, ED, 911: Yes (7/1/2025  2:02 PM)    Will continue to monitor through transition period.    Yocasta Vogel RN/CM  [facility] Population [facility]  [phone]

## 2025-07-07 ENCOUNTER — TELEPHONE (OUTPATIENT)
Dept: PRIMARY CARE | Facility: CLINIC | Age: 85
End: 2025-07-07
Payer: MEDICARE

## 2025-07-07 NOTE — TELEPHONE ENCOUNTER
Mecca from [facility] called office today with request if PCP will follow patient for home care and orders.  Mecca will need to know if PCP okay with home care to continue for 2 time a week for 2 weeks, 1 time a week for 6 weeks.  Her pulse was 51 beats per minute at rest below 60 beats needs provider advise to change  pule perimeter.    Medication check was completed :(some medications from [facility] found)  Aspirin 81 mg was found in home but want to know if ok to take  Hydralazine 25 mg 2 times a day, but direction take 3 times a day-this is from skilled nursing [facility]  Tylenol 650 mg, but should be taking 500 mg-from skilled nursing   Calcium D not a combo-from skilled nursing [facility]  Vitamin C-additional-from [facility]  Iron-not taking-from [facility] [phone]

## 2025-07-14 DIAGNOSIS — I10 PRIMARY HYPERTENSION: ICD-10-CM

## 2025-07-14 RX ORDER — HYDRALAZINE HYDROCHLORIDE 25 MG/1
25 TABLET, FILM COATED ORAL 3 TIMES DAILY
Qty: 90 TABLET | Refills: 11 | Status: SHIPPED | OUTPATIENT
Start: 2025-07-14 | End: 2026-07-14

## 2025-07-15 ENCOUNTER — OFFICE VISIT (OUTPATIENT)
Dept: ORTHOPEDIC SURGERY | Facility: CLINIC | Age: 85
End: 2025-07-15
Payer: MEDICARE

## 2025-07-15 DIAGNOSIS — M54.30 SCIATICA, UNSPECIFIED LATERALITY: ICD-10-CM

## 2025-07-15 DIAGNOSIS — M54.16 LUMBAR RADICULOPATHY: Primary | ICD-10-CM

## 2025-07-15 PROCEDURE — 1036F TOBACCO NON-USER: CPT | Performed by: ORTHOPAEDIC SURGERY

## 2025-07-15 PROCEDURE — 1159F MED LIST DOCD IN RCRD: CPT | Performed by: ORTHOPAEDIC SURGERY

## 2025-07-15 PROCEDURE — 99214 OFFICE O/P EST MOD 30 MIN: CPT | Performed by: ORTHOPAEDIC SURGERY

## 2025-07-15 PROCEDURE — 99212 OFFICE O/P EST SF 10 MIN: CPT | Performed by: ORTHOPAEDIC SURGERY

## 2025-07-15 PROCEDURE — 99202 OFFICE O/P NEW SF 15 MIN: CPT | Performed by: ORTHOPAEDIC SURGERY

## 2025-07-15 NOTE — LETTER
July 15, 2025     Nirmal Vazquez MD  [address] [address]    Patient: Sofia Calhoun   YOB: 1940   Date of Visit: 7/15/2025       Dear Dr. Nirmal Vazquez MD:    Thank you for referring Sofia Calhoun to me for evaluation. Below are my notes for this consultation.  If you have questions, please do not hesitate to call me. I look forward to following your patient along with you.       Sincerely,     Wilbert Gabriel MD      CC: No Recipients  ______________________________________________________________________________________     HPI:Sofia Calhoun is an 84-year-old woman who comes in with a history of 6 weeks of terrible right leg pain.  It goes all the way down her right leg.  She was actually admitted at [facility] and then sent to 2 weeks of rehab.  She has had physical therapy oral steroids and gabapentin.  She has not had recent steroid injections.      ROS:  Reviewed on EMR and patient intake sheet.    PMH/SH:  Reviewed on EMR and patient intake sheet.    Exam:  Physical Exam    Constitutional: Well appearing; no acute distress  Eyes: pupils are equal and round  Psych: normal affect  Respiratory: non-labored breathing  Cardiovascular: regular rate and rhythm  GI: non-distended abdomen  Musculoskeletal: no pain with range of motion of the hips bilaterally  Neurologic: [4 -]/5 strength in the lower extremities bilaterally]; [negative] straight leg raise    Radiology:     MRI was personally reviewed.  It demonstrates severe foraminal stenosis on the right side at L4-5.  Moderate lateral recess narrowing on the right side at L3-4 and bilateral lateral recess narrowing at L5-S1.  She has a degenerative lumbar scoliosis.    Diagnosis:    Lumbar radiculopathy; lumbar stenosis    Assessment and Plan:   84-year-old woman with lumbar radiculopathy.  Secondary to the patient's complex deformity as well as her underlying bone quality, I have recommended we try to avoid  any surgical intervention.  She will be set up for some steroid injections.  She was given referral.  I can see her back as needed if her symptoms are intolerable.    At the end of the visit, I asked the patient if there were any further questions.  Although no further questions were provided at this time, I would be happy to see the patient back in the future to discuss any further questions or concerns.      Wilbert Gabriel MD    Chief of Spine Surgery, [facility]  Director of Spine Service, [facility]  , Department of Orthopaedics  [facility] [employer]  [address] [place] [address]  P: [phone]  [facility]    This note was dictated with voice recognition software.  It has not been proofread for grammatical errors, typographical mistakes or other semantic inconsistencies.

## 2025-07-15 NOTE — PROGRESS NOTES
HPI:Sofia Calhoun is an 84-year-old woman who comes in with a history of 6 weeks of terrible right leg pain.  It goes all the way down her right leg.  She was actually admitted at [facility] and then sent to 2 weeks of rehab.  She has had physical therapy oral steroids and gabapentin.  She has not had recent steroid injections.      ROS:  Reviewed on EMR and patient intake sheet.    PMH/SH:  Reviewed on EMR and patient intake sheet.    Exam:  Physical Exam    Constitutional: Well appearing; no acute distress  Eyes: pupils are equal and round  Psych: normal affect  Respiratory: non-labored breathing  Cardiovascular: regular rate and rhythm  GI: non-distended abdomen  Musculoskeletal: no pain with range of motion of the hips bilaterally  Neurologic: [4 -]/5 strength in the lower extremities bilaterally]; [negative] straight leg raise    Radiology:     MRI was personally reviewed.  It demonstrates severe foraminal stenosis on the right side at L4-5.  Moderate lateral recess narrowing on the right side at L3-4 and bilateral lateral recess narrowing at L5-S1.  She has a degenerative lumbar scoliosis.    Diagnosis:    Lumbar radiculopathy; lumbar stenosis    Assessment and Plan:   84-year-old woman with lumbar radiculopathy.  Secondary to the patient's complex deformity as well as her underlying bone quality, I have recommended we try to avoid any surgical intervention.  She will be set up for some steroid injections.  She was given referral.  I can see her back as needed if her symptoms are intolerable.    At the end of the visit, I asked the patient if there were any further questions.  Although no further questions were provided at this time, I would be happy to see the patient back in the future to discuss any further questions or concerns.      Wilbert Gabriel MD    Chief of Spine Surgery, [facility]  Director of Spine Service, [facility]  [place]  , Department of Orthopaedics  [facility] [employer]  [address]  [place] [id number]  P: [phone]  [facility]    This note was dictated with voice recognition software.  It has not been proofread for grammatical errors, typographical mistakes or other semantic inconsistencies.

## 2025-07-17 ENCOUNTER — PATIENT OUTREACH (OUTPATIENT)
Dept: CARE COORDINATION | Facility: CLINIC | Age: 85
End: 2025-07-17
Payer: MEDICARE

## 2025-07-17 NOTE — PROGRESS NOTES
Outreach call to patient following up on appointment with Orthopedic Surgeon.  Patient states, she is doing pretty good. Patient continues to work with PT. Patient states, she is still unable to put weight on her right leg. Patient remains wheelchair bound at this time. Patient takes Tylenol at night. Discussed appointment.  Discussed plan of care.: avoid surgery and start steroid injections in her spine  Scheduled to meet with Pain Management 8/5/2025  Patient with no additional questions at this time.  Will continue to follow.      Yocasta Vogel RN/CM  [facility]  [phone]

## 2025-07-28 ENCOUNTER — TELEPHONE (OUTPATIENT)
Dept: PRIMARY CARE | Facility: CLINIC | Age: 85
End: 2025-07-28
Payer: MEDICARE

## 2025-07-28 NOTE — TELEPHONE ENCOUNTER
[facility] home care is in need of a order for patient to  additional visits for home care services.

## 2025-07-29 NOTE — TELEPHONE ENCOUNTER
Nico from [facility] called they want to know if Sofia is supposed to continue to take the methocarbamol,  [phone]

## 2025-07-31 DIAGNOSIS — F32.A DEPRESSION, UNSPECIFIED DEPRESSION TYPE: ICD-10-CM

## 2025-07-31 RX ORDER — FLUOXETINE 20 MG/1
20 CAPSULE ORAL 2 TIMES DAILY
Qty: 60 CAPSULE | Refills: 3 | Status: SHIPPED | OUTPATIENT
Start: 2025-07-31

## 2025-08-05 ENCOUNTER — OFFICE VISIT (OUTPATIENT)
Dept: PAIN MEDICINE | Facility: HOSPITAL | Age: 85
End: 2025-08-05
Payer: MEDICARE

## 2025-08-05 ENCOUNTER — PATIENT OUTREACH (OUTPATIENT)
Dept: CARE COORDINATION | Facility: CLINIC | Age: 85
End: 2025-08-05

## 2025-08-05 DIAGNOSIS — M54.16 LUMBAR RADICULOPATHY: ICD-10-CM

## 2025-08-05 PROCEDURE — 1159F MED LIST DOCD IN RCRD: CPT | Performed by: ANESTHESIOLOGY

## 2025-08-05 PROCEDURE — 1125F AMNT PAIN NOTED PAIN PRSNT: CPT | Performed by: ANESTHESIOLOGY

## 2025-08-05 PROCEDURE — 99204 OFFICE O/P NEW MOD 45 MIN: CPT | Performed by: ANESTHESIOLOGY

## 2025-08-05 ASSESSMENT — PAIN SCALES - GENERAL: PAINLEVEL_OUTOF10: 10-WORST PAIN EVER

## 2025-08-05 NOTE — PROGRESS NOTES
Outreach call to patient to check in 30 days after hospital discharge to support smooth transition of care. Patient states, she is doing well.  She went to pain management today for continued right leg pain. She is scheduled for an injection with pain management on 8/25/2025. Patient states, she has a couple more sessions with PT. Patient with no additional needs noted. No additional outreach needed at this time.  CM will close case    Yocasta Vogel RN/ALLEY  [facility]  [phone]

## 2025-08-05 NOTE — PROGRESS NOTES
Subjective   Patient ID: Sofia Calhoun is a 84 y.o. female with a past medical history of HTN presenting today for evaluation of right leg pain.     The patient was referred by Dr. Gabriel with orthopedic spine surgery.    HPI:   Sofia Calhoun is an 84-year-old female presenting today for evaluation of right leg pain.  Has had shooting pains down her right leg to the level of the knee for about 10 weeks, does feel some numbness and tingling down to her ankle and right leg.  No symptoms in the left leg.  Does have bilateral lower back pain which is chronic, but is not bothersome today.  Does not report loss of strength in her bilateral lower extremities.  Has been taking gabapentin and tylenol at home which have not provided relief.  Says she previously received injections in her back which did not provide relief for back pain.  The patient says that the pain she currently has is different than what she was experiencing in the past.  Right now she is having predominant leg pain.  She says that she is in physical therapy right now for this right leg pain, which has not provided relief.  Pain at times can be a 10 out of 10 particularly if she tries to stand or walk.    Pain was so terrible recently that she was admitted at [facility] and then went subsequently to rehab for a couple of weeks inpatient.    While she did see spine surgery, the patient was not deemed to be a good surgical candidate due to her scoliosis, bone quality, and the major undertaking it would be to correct her spine.    The patient remotely saw Dr. Allen and underwent a right sided L4 and L5 transforaminal last completed in December 2021.  Dexamethasone was given in the injection.    Physical Therapy: The patient has done six or more weeks of physical therapy in the past six months with minimal improvement  Other Conservative Measures she has tried: Heating Pad and Ice  Classes of medications tried in the past: Acetaminophen,  NSAIDs, and Gabapentenoids    Last Urine Drug Screen:  Recent Results (from the past 8760 hours)   Drug Screen, Urine With Reflex to Confirmation    Collection Time: 11/26/24 12:21 PM   Result Value Ref Range    Amphetamine Screen, Urine Presumptive Negative Presumptive Negative    Barbiturate Screen, Urine Presumptive Negative Presumptive Negative    Benzodiazepines Screen, Urine Presumptive Negative Presumptive Negative    Cannabinoid Screen, Urine Presumptive Negative Presumptive Negative    Cocaine Metabolite Screen, Urine Presumptive Negative Presumptive Negative    Fentanyl Screen, Urine Presumptive Negative Presumptive Negative    Opiate Screen, Urine Presumptive Negative Presumptive Negative    Oxycodone Screen, Urine Presumptive Negative Presumptive Negative    PCP Screen, Urine Presumptive Negative Presumptive Negative    Methadone Screen, Urine Presumptive Negative Presumptive Negative     Results are as expected.       Review of Systems   13-point ROS done and negative except for HPI.     Current Outpatient Medications   Medication Instructions    clonazePAM (KLONOPIN) 0.5 mg, oral, Nightly    diclofenac (VOLTAREN) 50 mg, oral, Daily    FLUoxetine (PROZAC) 20 mg, oral, 2 times daily    gabapentin (NEURONTIN) 400 mg, oral, Every 6 hours PRN    hydrALAZINE (APRESOLINE) 25 mg, oral, 3 times daily    lisinopril 20 mg, oral, Daily    metoprolol succinate XL (Toprol-XL) 25 mg 24 hr tablet 1 EVERY BEDTIME WHICH REPLACES CARVEDILOL    pantoprazole (PROTONIX) 40 mg, oral, Daily    simvastatin (ZOCOR) 40 mg, oral, Daily, as directed       Medical History[1]     Surgical History[2]     Family History[3]     RX Allergies[4]     Objective     There were no vitals filed for this visit.     Physical Exam  General: NAD, well groomed, well nourished  Eyes: Non-icteric sclera, EOMI  Ears, Nose, Mouth, and Throat: External ears and nose appear to be without deformity or rash. No lesions or masses noted. Hearing is grossly  intact.   Neck: Trachea midline  Respiratory: Nonlabored breathing   Cardiovascular: no peripheral edema   Skin: No rashes or open lesions/ulcers identified on skin.    Back:   Palpation: No tenderness to palpation over lumbar paraspinous muscles.   Straight leg raise: positive at 45 degrees on the right   JUANITA Maneuver does not reproduce pain bilaterally    Hip: No pain over greater trochanters. and Pain not reproduced with hip internal/external rotation.     Neurologic:   Cranial nerves grossly intact.   Strength 5/5 and symmetric plantar/dorsiflexion   Sensation: Normal to light touch throughout, pinprick  intact throughout.    Psychiatric: Alert, orientation to person, place, and time. Cooperative.    Imaging personally reviewed and independently interpreted: MRI lumbar spine (6/10/2025) shows spinal canal stenosis worst at L4-L5    Assessment/Plan   Sofia Calhoun is a 84-year-old woman presenting for evaluation of right leg pain.  Pain down the right thigh to the level of the knee for about 10 weeks, which has significantly affected her ability to ambulate and complete daily activities.  Had an MRI of the lumbar spine done 2 months ago which showed spinal canal stenosis worst at L4-L5.  I believe an epidural steroid injection could be beneficial for the radicular symptoms down her right lower extremity, we will schedule this and assess pain postprocedure.    Plan:  -L4-L5 epidural steroid injection with right bias.  Procedure, risk, benefits, alternatives reviewed with the patient.    -Encouraged to keep up with physical therapy and core strengthening.    The patient has failed treatment with : Physical therapy  and have significant impairments of their activities of daily living (ADLs) due to the pain    We discussed  the risks, benefits and alternatives of the procedure including but not limited to: , Lack of efficacy , Transiently worsening pain , Bleeding, Infection , and Nerve Damage    Follow up: After  procedure    The patient was invited to contact us back anytime with any questions or concerns and follow-up with us in the office as needed.     Diagnoses and all orders for this visit:  Lumbar radiculopathy  -     Epidural Steroid Injection; Future  -     FL pain management; Future  Other orders  -     Referral to Pain Management  -     NPO Diet Except: Sips with meds; Effective now; Standing  -     Height and weight; Standing  -     Insert and maintain peripheral IV; Standing  -     Saline lock IV; Standing  -     Adult diet Regular; Standing  -     Vital Signs; Standing  -     Neuro checks; Standing  -     Notify physician - Standard Parameters; Standing      This note was generated with the aid of dictation software, there may be typos despite my attempts at proofreading.      Linden Olivier MD  Anesthesiology PGY1         [1]   Past Medical History:  Diagnosis Date    Acute pericarditis, unspecified ([facility])     Acute pericarditis    Benign neoplasm of meninges, unspecified     Meningioma    Nausea 05/09/2014    Nausea    Other intervertebral disc degeneration, lumbar region 12/02/2013    Disc degeneration, lumbar    Pain in leg, unspecified 06/30/2013    Lower extremity pain    Pain in unspecified joint     Joint pain    Personal history of other diseases of the digestive system 05/30/2015    History of esophageal reflux    Personal history of other diseases of the musculoskeletal system and connective tissue     History of osteopenia    Personal history of other infectious and parasitic diseases 03/05/2015    History of tinea corporis    Personal history of other specified conditions 05/09/2014    History of diarrhea    Right upper quadrant pain 06/01/2015    RUQ abdominal pain    Spondylosis without myelopathy or radiculopathy, lumbar region 12/02/2013    Lumbar spondylosis    Unspecified convulsions (Multi)     Seizure   [2]   Past Surgical History:  Procedure Laterality Date    APPENDECTOMY   03/19/2014    Appendectomy   [3] No family history on file.  [4] No Known Allergies

## 2025-08-25 ENCOUNTER — HOSPITAL ENCOUNTER (OUTPATIENT)
Facility: HOSPITAL | Age: 85
Discharge: HOME | End: 2025-08-25
Payer: MEDICARE

## 2025-08-25 VITALS
OXYGEN SATURATION: 96 % | RESPIRATION RATE: 16 BRPM | HEART RATE: 59 BPM | SYSTOLIC BLOOD PRESSURE: 137 MMHG | TEMPERATURE: 98.4 F | DIASTOLIC BLOOD PRESSURE: 70 MMHG

## 2025-08-25 DIAGNOSIS — M54.16 LUMBAR RADICULOPATHY: ICD-10-CM

## 2025-08-25 PROCEDURE — 2500000004 HC RX 250 GENERAL PHARMACY W/ HCPCS (ALT 636 FOR OP/ED): Mod: JZ | Performed by: ANESTHESIOLOGY

## 2025-08-25 PROCEDURE — 62323 NJX INTERLAMINAR LMBR/SAC: CPT | Performed by: ANESTHESIOLOGY

## 2025-08-25 PROCEDURE — 7100000009 HC PHASE TWO TIME - INITIAL BASE CHARGE

## 2025-08-25 PROCEDURE — 2550000001 HC RX 255 CONTRASTS: Performed by: ANESTHESIOLOGY

## 2025-08-25 PROCEDURE — 7100000010 HC PHASE TWO TIME - EACH INCREMENTAL 1 MINUTE

## 2025-08-25 RX ORDER — LIDOCAINE HYDROCHLORIDE 5 MG/ML
INJECTION, SOLUTION INFILTRATION; INTRAVENOUS AS NEEDED
Status: COMPLETED | OUTPATIENT
Start: 2025-08-25 | End: 2025-08-25

## 2025-08-25 RX ORDER — MIDAZOLAM HYDROCHLORIDE 1 MG/ML
INJECTION, SOLUTION INTRAMUSCULAR; INTRAVENOUS AS NEEDED
Status: COMPLETED | OUTPATIENT
Start: 2025-08-25 | End: 2025-08-25

## 2025-08-25 RX ORDER — METHYLPREDNISOLONE ACETATE 40 MG/ML
INJECTION, SUSPENSION INTRA-ARTICULAR; INTRALESIONAL; INTRAMUSCULAR; SOFT TISSUE AS NEEDED
Status: COMPLETED | OUTPATIENT
Start: 2025-08-25 | End: 2025-08-25

## 2025-08-25 RX ADMIN — MIDAZOLAM 1 MG: 1 INJECTION INTRAMUSCULAR; INTRAVENOUS at 08:28

## 2025-08-25 RX ADMIN — METHYLPREDNISOLONE ACETATE 40 MG: 40 INJECTION, SUSPENSION INTRA-ARTICULAR; INTRALESIONAL; INTRAMUSCULAR; SOFT TISSUE at 08:39

## 2025-08-25 RX ADMIN — IOHEXOL 4 ML: 240 INJECTION, SOLUTION INTRATHECAL; INTRAVASCULAR; INTRAVENOUS; ORAL at 08:38

## 2025-08-25 RX ADMIN — LIDOCAINE HYDROCHLORIDE 8 ML: 5 INJECTION, SOLUTION INFILTRATION at 08:28

## 2025-08-25 ASSESSMENT — PAIN SCALES - GENERAL
PAINLEVEL_OUTOF10: 5 - MODERATE PAIN
PAINLEVEL_OUTOF10: 7
PAINLEVEL_OUTOF10: 4
PAINLEVEL_OUTOF10: 2
PAINLEVEL_OUTOF10: 4
PAINLEVEL_OUTOF10: 7

## 2025-08-25 ASSESSMENT — PAIN - FUNCTIONAL ASSESSMENT
PAIN_FUNCTIONAL_ASSESSMENT: WONG-BAKER FACES
PAIN_FUNCTIONAL_ASSESSMENT: WONG-BAKER FACES
PAIN_FUNCTIONAL_ASSESSMENT: 0-10
PAIN_FUNCTIONAL_ASSESSMENT: WONG-BAKER FACES
PAIN_FUNCTIONAL_ASSESSMENT: 0-10
PAIN_FUNCTIONAL_ASSESSMENT: 0-10